# Patient Record
Sex: FEMALE | Race: WHITE | NOT HISPANIC OR LATINO | Employment: OTHER | URBAN - METROPOLITAN AREA
[De-identification: names, ages, dates, MRNs, and addresses within clinical notes are randomized per-mention and may not be internally consistent; named-entity substitution may affect disease eponyms.]

---

## 2018-11-08 ENCOUNTER — HOSPITAL ENCOUNTER (OUTPATIENT)
Dept: RADIOLOGY | Facility: MEDICAL CENTER | Age: 83
End: 2018-11-08

## 2018-11-08 ENCOUNTER — APPOINTMENT (OUTPATIENT)
Dept: RADIOLOGY | Facility: MEDICAL CENTER | Age: 83
End: 2018-11-08
Attending: EMERGENCY MEDICINE
Payer: MEDICARE

## 2018-11-08 ENCOUNTER — HOSPITAL ENCOUNTER (EMERGENCY)
Facility: MEDICAL CENTER | Age: 83
End: 2018-11-08
Attending: EMERGENCY MEDICINE
Payer: MEDICARE

## 2018-11-08 VITALS
TEMPERATURE: 97.4 F | BODY MASS INDEX: 29.93 KG/M2 | HEIGHT: 67 IN | SYSTOLIC BLOOD PRESSURE: 146 MMHG | OXYGEN SATURATION: 94 % | DIASTOLIC BLOOD PRESSURE: 75 MMHG | WEIGHT: 190.7 LBS | HEART RATE: 61 BPM | RESPIRATION RATE: 20 BRPM
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
## 2018-11-08 DIAGNOSIS — Z87.440 HISTORY OF UTI: ICD-10-CM

## 2018-11-08 DIAGNOSIS — M79.662 PAIN IN LEFT LOWER LEG: ICD-10-CM

## 2018-11-08 LAB
APPEARANCE UR: CLEAR
BACTERIA #/AREA URNS HPF: NEGATIVE /HPF
BILIRUB UR QL STRIP.AUTO: NEGATIVE
CAOX CRY #/AREA URNS HPF: ABNORMAL /HPF
COLOR UR: YELLOW
EPI CELLS #/AREA URNS HPF: ABNORMAL /HPF
GLUCOSE UR STRIP.AUTO-MCNC: NEGATIVE MG/DL
HYALINE CASTS #/AREA URNS LPF: ABNORMAL /LPF
KETONES UR STRIP.AUTO-MCNC: NEGATIVE MG/DL
LEUKOCYTE ESTERASE UR QL STRIP.AUTO: ABNORMAL
MICRO URNS: ABNORMAL
NITRITE UR QL STRIP.AUTO: NEGATIVE
PH UR STRIP.AUTO: 5 [PH]
PROT UR QL STRIP: NEGATIVE MG/DL
RBC # URNS HPF: ABNORMAL /HPF
RBC UR QL AUTO: ABNORMAL
SP GR UR STRIP.AUTO: 1.02
UROBILINOGEN UR STRIP.AUTO-MCNC: 0.2 MG/DL
WBC #/AREA URNS HPF: ABNORMAL /HPF

## 2018-11-08 PROCEDURE — 93971 EXTREMITY STUDY: CPT | Mod: LT

## 2018-11-08 PROCEDURE — 81001 URINALYSIS AUTO W/SCOPE: CPT

## 2018-11-08 PROCEDURE — 87086 URINE CULTURE/COLONY COUNT: CPT

## 2018-11-08 PROCEDURE — 99284 EMERGENCY DEPT VISIT MOD MDM: CPT

## 2018-11-08 PROCEDURE — 99283 EMERGENCY DEPT VISIT LOW MDM: CPT

## 2018-11-08 ASSESSMENT — PAIN SCALES - GENERAL: PAINLEVEL_OUTOF10: 7

## 2018-11-08 NOTE — ED PROVIDER NOTES
"ED Provider Note    CHIEF COMPLAINT  Chief Complaint   Patient presents with   • GLF     Pt. states she fell in the bathroom approx two days ago. Pt. states tightness and pain in her left calf area. Pt. states hx. of DVTs. Pt. states hx of atrial fibrillation and is on predaxa for that. Pulses are palpable in pt's left leg. Pt. also states hx of recent UTI.       HPI  Martha Lopez is a 83 y.o. female who presents to the emergency department through triage with her son after being transferred from outside facility for further evaluation.  Patient complains of left low leg pain for 3 days.  Patient did have a fall at this time although states that she landed on her right side and has had some right-sided aching.  Noticed more so left calf discomfort yesterday \"sharp when I take a step\"otherwise \"like a cramp.\"  No relief with heat.  No swelling or discoloration.  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  She is on Pradaxa for atrial fibrillation the last 7 years.      Patient transferred from outside facility for venous study to exclude DVT.  Patient with WBC 14.0 and elevated d-dimer 1490 (normal 100-400) at outside facility.    Additionally, patient was hospitalized with what sounds like a pyelonephritis a few weeks ago before traveling here to the [facility] to be with her son.  She had persistent bacteriuria and was treated with 5 days of antibiotics while here last week.  Patient also recently treated for sinus infection per her son.  Not currently on any antibiotics.  Patient returned to the hospital last night for the leg pain at which time the above was found.  Patient denies chest pain, shortness of breath, palpitations or syncope.  Denies fever chills.  Denies dysuria, hematuria, frequency (other than normal due to increased fluid intake), abdominal pain.  Son mentions patient might have a tear or bedsore on her buttock is concerned that this could be infection \"because her white " "blood cell count was up.\"    REVIEW OF SYSTEMS  See HPI for further details.     PAST MEDICAL HISTORY       SOCIAL HISTORY  Social History     Social History Main Topics   • Smoking status: Never Smoker   • Smokeless tobacco: Never Used   • Alcohol use No   • Drug use: No   • Sexual activity: Not on file       SURGICAL HISTORY  patient denies any surgical history    CURRENT MEDICATIONS  Home Medications     Reviewed by Priscilla Alfonso R.N. (Registered Nurse) on 11/08/18 at 0435  Med List Status: Partial   Medication Last Dose Status        Patient Clarence Taking any Medications                       ALLERGIES  Allergies   Allergen Reactions   • Heparin    • Sulfa Drugs        PHYSICAL EXAM  VITAL SIGNS: /75   Pulse 61   Temp 36.3 °C (97.4 °F) (Temporal)   Resp 20   Ht 1.689 m (5' 6.5\")   Wt 86.5 kg (190 lb 11.2 oz)   SpO2 94%   BMI 30.32 kg/m²   Pulse ox interpretation: I interpret this pulse ox as normal.  Constitutional: Alert in no apparent distress.  HENT: Normocephalic, atraumatic. Bilateral external ears normal, Nose normal. Moist mucous membranes.    Eyes: Pupils are equal and reactive, Conjunctiva normal.   Neck: Normal range of motion, Supple  Lymphatic: No lymphadenopathy noted.   Cardiovascular: Regular rate and rhythm, no murmurs. Distal pulses intact.  Mild bilateral lower extremity nonpitting edema.  Thorax & Lungs: Normal breath sounds.  No wheezing/rales/ronchi. No increased work of breathing  Abdomen: Obese, soft, non-distended, non-tender to palpation. No palpable or pulsatile masses. No peritoneal signs. No CVA tenderness.  Skin: Warm, Dry, No erythema, No rash.  Subacute ecchymosis bilateral inferior medial buttock without hematoma or skin erosion.  Musculoskeletal: Good range of motion in all major joints. No major deformities noted.  No reproducible discomfort with palpation at the calf or anterior lower leg.  No swelling or discoloration.  2+ DP bilaterally, sensation intact " light touch.  Well-healed knee incisions, scarring, bilaterally.  Neurologic: Alert and oriented x4.  Speech clear and cohesive.  Moves 4 extremities spontaneously.  Psychiatric: Affect normal, Judgment normal, Mood normal.       DIAGNOSTIC STUDIES / PROCEDURES    LABS  Results for orders placed or performed during the hospital encounter of 11/08/18   URINALYSIS,CULTURE IF INDICATED   Result Value Ref Range    Color Yellow     Character Clear     Specific Gravity 1.025 <1.035    Ph 5.0 5.0 - 8.0    Glucose Negative Negative mg/dL    Ketones Negative Negative mg/dL    Protein Negative Negative mg/dL    Bilirubin Negative Negative    Urobilinogen, Urine 0.2 Negative    Nitrite Negative Negative    Leukocyte Esterase Trace (A) Negative    Occult Blood Trace (A) Negative    Micro Urine Req Microscopic    URINE MICROSCOPIC (W/UA)   Result Value Ref Range    WBC 2-5 /hpf    RBC 0-2 /hpf    Bacteria Negative None /hpf    Epithelial Cells Few /hpf    Ca Oxalate Crystal Few /hpf    Hyaline Cast 3-5 (A) /lpf     RADIOLOGY  US-EXTREMITY VENOUS LOWER UNILAT LEFT          FINDINGS:    The deep & superficial veins of the left lower extremity are readily    compressible with normal venous flow dynamics including spontaneous flow,    respiratory phasic variation and augmentation.  No evidence of superficial    or deep venous thrombosis.       COURSE & MEDICAL DECISION MAKING  ED evaluation for left low leg pain is unrevealing.  Suspect strain or contusion secondary to her fall a few days ago, although patient has had increased activity while visiting family here in town, may be contributing to this as well.  No cutaneous evidence for cellulitis, abscess or necrotizing fasciitis.  Ultrasound negative for DVT.  CMS intact distally.  Patient is really quite asymptomatic in the emergency department, discomfort is not reproducible on my exam.    Referred to this facility for leukocytosis and elevated d-dimer.  WBC 14.0, this is quite  nonspecific.  Recently treated for UTI and sinus infection.  Patient denies any urinary symptoms.  Urinalysis is marginal, only trace leukocyte esterase, 2-5 WBCs and no bacteria, with few epithelials, this may be contamination as this was a clean-catch sample.  Culture has been ordered. I do not believe that antibiotics are indicated at this time.  Patient is hemodynamically stable without fever tachycardia.  No clinical evidence for sepsis.    Patient is stable for discharge at this time, anticipatory guidance provided, continue home medications, Tylenol for discomfort, close follow-up is encouraged, and strict ED return instructions have been detailed. Patient and her son are agreeable to the disposition and plan.    Patient's blood pressure was elevated in the emergency department, and has been referred to primary care for close monitoring.      FINAL IMPRESSION  (M79.662) Pain in left lower leg  (W19.XXXA) Fall, initial encounter  (Z87.440) History of UTI      Electronically signed by: Michelle Lerma, 11/8/2018 5:26 AM      This dictation was created using voice recognition software. The accuracy of the dictation is limited to the abilities of the software. I expect there may be some errors of grammar and possibly content. The nursing notes were reviewed and certain aspects of this information were incorporated into this note.

## 2018-11-08 NOTE — ED TRIAGE NOTES
"Martha Lopez  83 y.o. female  Chief Complaint   Patient presents with   • GLF     Pt. states she fell in the bathroom approx two days ago. Pt. states tightness and pain in her left calf area. Pt. states hx. of DVTs. Pt. states hx of atrial fibrillation and is on predaxa for that. Pulses are palpable in pt's left leg. Pt. also states hx of recent UTI.     /75   Pulse 61   Temp 36.3 °C (97.4 °F) (Temporal)   Resp 20   Ht 1.689 m (5' 6.5\")   Wt 86.5 kg (190 lb 11.2 oz)   SpO2 93%   BMI 30.32 kg/m²     Pt amb to triage via wheelchair. Charge RN notified of this pt. Pt. Is a transfer from [facility] for Dr. Powell.  Pt is alert and oriented, speaking in full sentences, follows commands and responds appropriately to questions. NAD. Resp are even and unlabored.  Pt placed in lobby. Pt educated on triage process. Pt encouraged to alert staff for any changes.  "

## 2018-11-08 NOTE — DISCHARGE INSTRUCTIONS
Follow-up with your primary care physician when you return to [place] for reevaluation, medication management close blood pressure monitoring.    Continue home medications as previously indicated.    Tylenol every 4-6 hours as needed for any discomfort.  Activity as tolerated.    Return to the emergency department for persistent worsening leg pain, swelling, discoloration, paresthesias or for other complaints recurrent urinary symptoms, fever, altered mental status or other new concerns.

## 2018-11-10 LAB
BACTERIA UR CULT: NORMAL
SIGNIFICANT IND 70042: NORMAL
SITE SITE: NORMAL
SOURCE SOURCE: NORMAL

## 2025-04-27 NOTE — ED NOTES
Patient verbalizes understanding of follow up and when to return to the ED.  Patient discharged with son   show